# Patient Record
Sex: MALE | Race: WHITE
[De-identification: names, ages, dates, MRNs, and addresses within clinical notes are randomized per-mention and may not be internally consistent; named-entity substitution may affect disease eponyms.]

---

## 2020-08-27 ENCOUNTER — HOSPITAL ENCOUNTER (OUTPATIENT)
Dept: HOSPITAL 92 - TBSIIMAG | Age: 70
Discharge: HOME | End: 2020-08-27
Attending: NEUROLOGICAL SURGERY
Payer: MEDICARE

## 2020-08-27 DIAGNOSIS — R29.890: ICD-10-CM

## 2020-08-27 DIAGNOSIS — M41.86: ICD-10-CM

## 2020-08-27 DIAGNOSIS — S32.051D: ICD-10-CM

## 2020-08-27 DIAGNOSIS — M47.816: ICD-10-CM

## 2020-08-27 DIAGNOSIS — Z95.828: ICD-10-CM

## 2020-08-27 DIAGNOSIS — M54.9: Primary | ICD-10-CM

## 2020-08-27 PROCEDURE — 72100 X-RAY EXAM L-S SPINE 2/3 VWS: CPT
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
## 2020-08-31 ENCOUNTER — HOSPITAL ENCOUNTER (OUTPATIENT)
Dept: HOSPITAL 92 - LABBT | Age: 70
Discharge: HOME | End: 2020-08-31
Attending: THORACIC SURGERY (CARDIOTHORACIC VASCULAR SURGERY)
Payer: MEDICARE

## 2020-08-31 DIAGNOSIS — T45.515A: ICD-10-CM

## 2020-08-31 DIAGNOSIS — Z20.828: Primary | ICD-10-CM

## 2020-08-31 PROCEDURE — U0003 INFECTIOUS AGENT DETECTION BY NUCLEIC ACID (DNA OR RNA); SEVERE ACUTE RESPIRATORY SYNDROME CORONAVIRUS 2 (SARS-COV-2) (CORONAVIRUS DISEASE [COVID-19]), AMPLIFIED PROBE TECHNIQUE, MAKING USE OF HIGH THROUGHPUT TECHNOLOGIES AS DESCRIBED BY CMS-2020-01-R: HCPCS

## 2020-08-31 PROCEDURE — 87635 SARS-COV-2 COVID-19 AMP PRB: CPT
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
## 2020-09-04 ENCOUNTER — HOSPITAL ENCOUNTER (OUTPATIENT)
Dept: HOSPITAL 92 - CCL | Age: 70
Discharge: ANOTHER HOSPITAL | End: 2020-09-04
Attending: THORACIC SURGERY (CARDIOTHORACIC VASCULAR SURGERY)
Payer: MEDICARE

## 2020-09-04 VITALS — BODY MASS INDEX: 25.1 KG/M2

## 2020-09-04 DIAGNOSIS — Z88.0: ICD-10-CM

## 2020-09-04 DIAGNOSIS — I10: ICD-10-CM

## 2020-09-04 DIAGNOSIS — Z79.899: ICD-10-CM

## 2020-09-04 DIAGNOSIS — Z88.5: ICD-10-CM

## 2020-09-04 DIAGNOSIS — F03.90: ICD-10-CM

## 2020-09-04 DIAGNOSIS — Z88.2: ICD-10-CM

## 2020-09-04 DIAGNOSIS — Z45.89: Primary | ICD-10-CM

## 2020-09-04 DIAGNOSIS — Z77.098: ICD-10-CM

## 2020-09-04 DIAGNOSIS — E11.9: ICD-10-CM

## 2020-09-04 DIAGNOSIS — Z88.1: ICD-10-CM

## 2020-09-04 DIAGNOSIS — Z79.82: ICD-10-CM

## 2020-09-04 DIAGNOSIS — J44.9: ICD-10-CM

## 2020-09-04 PROCEDURE — 06PY3DZ REMOVAL OF INTRALUMINAL DEVICE FROM LOWER VEIN, PERCUTANEOUS APPROACH: ICD-10-PCS | Performed by: THORACIC SURGERY (CARDIOTHORACIC VASCULAR SURGERY)

## 2020-09-04 PROCEDURE — 99152 MOD SED SAME PHYS/QHP 5/>YRS: CPT

## 2020-09-04 PROCEDURE — 76942 ECHO GUIDE FOR BIOPSY: CPT

## 2020-09-04 PROCEDURE — 99153 MOD SED SAME PHYS/QHP EA: CPT

## 2020-09-04 PROCEDURE — 37193 REM ENDOVAS VENA CAVA FILTER: CPT

## 2020-09-04 NOTE — OP
DATE OF PROCEDURE:  09/04/2020



PREOPERATIVE DIAGNOSIS:  Desire for removal of temporary Cook Celect vena cava

filter. 



POSTOPERATIVE DIAGNOSES:  Desire for removal of temporary Cook Celect vena cava

filter. 

Trapped filter snare.



PROCEDURES PERFORMED:  

1. Ultrasound-guided right jugular access.

2. Inferior vena cavogram.

3. Long attempt at removal of filter with ultimate entrapment of snare in the filter

mechanism. 



ANESTHESIA:  1% lidocaine for local, 50 mcg fentanyl, 2 mg Versed for sedation and

pain control. 



ESTIMATED BLOOD LOSS:  Approximately 100.



TOTAL FLUORO TIME:  52.7 minutes.



CONTRAST:  40 mL.



DESCRIPTION OF PROCEDURE:  After consent was obtained, the patient was brought to

the cath lab, placed in supine position on cath lab table.  Appropriate central line

and monitors were placed.  At this time, the patient was given sedation.  Right neck

was prepped and draped in usual sterile fashion.  The skin and subcutaneous tissues

were anesthetized under ultrasound guidance. Using ultrasound guidance, the

percutaneous access to the jugular vein was obtained.  A 6-Yakut sheath was placed.

 Angled glide catheter and Bentson wire were passed down inferior to the filter and

hand-injected venacavogram performed, showing normal vena cava appearance with no

thrombus.  The sheath was removed and the tract upsized to the 12-Yakut Cook

sheath.  Using the Cook snare, multiple attempts at snaring the catheter were

performed, eventually passing what I felt to be the snare around the tip of the

catheter and it became entrapped.  This may have actually passed down underneath one

of the free limbs and become entrapped.  I could not get the catheter loose.   I

buddied a second snare and I was unable to snare the tip of the filter.  After

discussion with Dr. Rutledge in Interventional Radiology at [facility], he

suggested we transfer the patient to [place] and I spoke with Dr. Alberto Bailey, who has

accepted the patient and agreed to attempt to remove the filter with more advanced

techniques than I have available.  The catheters were sterilely bundled with the

sheath in place.  The patient will be transferred via ambulance to [place]. 







Job ID:  [id number]

## 2020-09-30 ENCOUNTER — HOSPITAL ENCOUNTER (OUTPATIENT)
Dept: HOSPITAL 92 - TBSIIMAG | Age: 70
Discharge: HOME | End: 2020-09-30
Attending: NEUROLOGICAL SURGERY
Payer: MEDICARE

## 2020-09-30 DIAGNOSIS — M48.56XA: Primary | ICD-10-CM

## 2020-09-30 PROCEDURE — 72100 X-RAY EXAM L-S SPINE 2/3 VWS: CPT

## 2020-09-30 NOTE — RAD
XR Lumbar Spine 2 Or 3 View



History: Closed compression fracture



Comparison: Radiograph August 27, 2020



Findings: Similar appearance complete breast fracture of L5 with retropulsion. Minimal height loss of
 the anterior superior T12 endplate.



No IVC filter is appreciated. Paraspinal soft tissues are unremarkable.



Impression: No progressive L5 burst fracture height loss.



Reported By: Reggie Alva 

Electronically Signed:  9/30/2020 2:22 PM

## 2020-10-26 ENCOUNTER — HOSPITAL ENCOUNTER (OUTPATIENT)
Dept: HOSPITAL 92 - SCSCT | Age: 70
Discharge: HOME | End: 2020-10-26
Attending: NEUROLOGICAL SURGERY
Payer: MEDICARE

## 2020-10-26 DIAGNOSIS — S22.008A: Primary | ICD-10-CM

## 2020-10-26 DIAGNOSIS — M48.061: ICD-10-CM

## 2020-10-26 DIAGNOSIS — M47.816: ICD-10-CM

## 2020-10-26 PROCEDURE — 72131 CT LUMBAR SPINE W/O DYE: CPT

## 2020-10-26 NOTE — CT
Exam: Lumbar spine CT without contrast



HISTORY: Follow-up lumbar spine fracture.



COMPARISON: Chest/abdomen/pelvis CT 7/24/2020.



FINDINGS:

Limited evaluation of the solid organs and paraspinal muscles as well as the alimentary canal by the 
lack of contrast and due to motion degradation. Grossly no abnormality. There are some nonspecific

right lower quadrant mesenteric lymph nodes. Atherosclerosis of a nonaneurysmal aorta is identified. 
No evidence of obstructive uropathy. Nonobstructing calculus in the left renal pelvis.

Once again there are 5 lumbar type vertebra. From L1 to L4, vertebral body heights are maintained. No
 fracture. There is progression of loss of vertebral body height at L5 with essentially vertebral

plana. There is retropulsion of the L5 vertebral body. There is moderate to severe severe central can
al stenosis at L5. There is bilateral facet hypertrophy. Moderate to severe bilateral neural

foraminal narrowing.

Limited evaluation the contents of the central spinal canal and neural foramina due to technique.

L2-L3: Vacuum disc phenomenon. Mild central canal stenosis. Mild bilateral neural foraminal narrowing
.

L3-L4: Broad-based disc bulge abuts the thecal sac. Mild central canal stenosis. Mild bilateral neura
l foraminal narrowing.

L4-L5: Broad-based disc bulge, ligamentum flavum thickening and facet hypertrophy. Moderate central c
anal stenosis. Moderate to severe bilateral neural foraminal narrowing.

L5-S1: Broad-based disc bulge. No significant central canal stenosis. Moderate to 

severe bilateral neural foraminal narrowing.



IMPRESSION:

1. Progression of loss of vertebral body height at L5. There is essentially vertebral plana. Retropul
nuris with moderate to severe central canal stenosis.

2. Multilevel degenerative changes of the lumbar spine as described above.



Transcribed Date/Time: 10/26/2020 1:36 PM



Reported By: Cody Mckinney 

Electronically Signed:  10/26/2020 1:39 PM

## 2020-11-30 ENCOUNTER — HOSPITAL ENCOUNTER (OUTPATIENT)
Dept: HOSPITAL 92 - SCSRAD | Age: 70
Discharge: HOME | End: 2020-11-30
Payer: MEDICARE

## 2020-11-30 DIAGNOSIS — S32.051A: ICD-10-CM

## 2020-11-30 DIAGNOSIS — M54.5: Primary | ICD-10-CM

## 2020-11-30 PROCEDURE — 72100 X-RAY EXAM L-S SPINE 2/3 VWS: CPT

## 2020-11-30 NOTE — RAD
LUMBAR SPINE RADIOGRAPHS:

 

Date:  11/30/2020

 

PROVIDED CLINICAL HISTORY:   

Fracture. 

 

FINDINGS:

 

Comparison with 09/30/2020. 

 

Five non-rib bearing lumbar-type vertebral bodies are demonstrated. Burst fracture of L5 is again see
n. Vertebral body heights appear otherwise preserved. Lumbar disc and facet degenerative changes appe
ar similar. Vascular calcification is again seen. Lumbar alignment appears unchanged. 

 

IMPRESSION: 

Redemonstration of L5 burst fracture, similar to prior. 

 

 

POS: IVETTE

## 2021-01-04 ENCOUNTER — HOSPITAL ENCOUNTER (OUTPATIENT)
Dept: HOSPITAL 92 - SCSRAD | Age: 71
Discharge: HOME | End: 2021-01-04
Attending: NEUROLOGICAL SURGERY
Payer: MEDICARE

## 2021-01-04 DIAGNOSIS — S22.008A: Primary | ICD-10-CM

## 2021-01-04 DIAGNOSIS — S32.051A: ICD-10-CM

## 2021-01-04 DIAGNOSIS — R29.890: ICD-10-CM

## 2021-01-04 PROCEDURE — 72100 X-RAY EXAM L-S SPINE 2/3 VWS: CPT

## 2021-01-04 NOTE — RAD
EXAM:

XR Lumbar Spine 2 Or 3 View



PROVIDED CLINICAL HISTORY:

Follow-up L5 fracture.



COMPARISON:

11/30/2020



FINDINGS:

The burst fracture L5 vertebral body is again seen. Degree of height loss is similar to prior exam. R
emaining vertebral body heights are within normal limits, and no subluxation is seen. Scattered

osteophytes are seen in the lumbar spine. Vascular calcifications are again seen in the abdominal aor
ta.



IMPRESSION:

L5 vertebral body burst fracture with similar degree of height loss compared to prior study.



Reported By: Darnell Wheat 

Electronically Signed:  1/4/2021 1:35 PM

## 2022-11-14 ENCOUNTER — HOSPITAL ENCOUNTER (EMERGENCY)
Dept: HOSPITAL 18 - NAV ERS | Age: 72
Discharge: ANOTHER HOSPITAL | End: 2022-11-14
Payer: MEDICARE

## 2022-11-14 ENCOUNTER — HOSPITAL ENCOUNTER (INPATIENT)
Dept: HOSPITAL 92 - CSHERS | Age: 72
LOS: 6 days | Discharge: HOME | DRG: 377 | End: 2022-11-20
Attending: INTERNAL MEDICINE | Admitting: INTERNAL MEDICINE
Payer: MEDICARE

## 2022-11-14 VITALS — BODY MASS INDEX: 27 KG/M2

## 2022-11-14 DIAGNOSIS — Z79.82: ICD-10-CM

## 2022-11-14 DIAGNOSIS — J44.9: ICD-10-CM

## 2022-11-14 DIAGNOSIS — Z20.822: ICD-10-CM

## 2022-11-14 DIAGNOSIS — Z98.41: ICD-10-CM

## 2022-11-14 DIAGNOSIS — R45.1: ICD-10-CM

## 2022-11-14 DIAGNOSIS — Z88.1: ICD-10-CM

## 2022-11-14 DIAGNOSIS — E87.29: ICD-10-CM

## 2022-11-14 DIAGNOSIS — E86.1: ICD-10-CM

## 2022-11-14 DIAGNOSIS — Z87.891: ICD-10-CM

## 2022-11-14 DIAGNOSIS — K92.2: Primary | ICD-10-CM

## 2022-11-14 DIAGNOSIS — Z88.0: ICD-10-CM

## 2022-11-14 DIAGNOSIS — N17.9: ICD-10-CM

## 2022-11-14 DIAGNOSIS — I10: ICD-10-CM

## 2022-11-14 DIAGNOSIS — K26.4: Primary | ICD-10-CM

## 2022-11-14 DIAGNOSIS — D62: ICD-10-CM

## 2022-11-14 DIAGNOSIS — Z88.2: ICD-10-CM

## 2022-11-14 DIAGNOSIS — E11.65: ICD-10-CM

## 2022-11-14 DIAGNOSIS — Z98.42: ICD-10-CM

## 2022-11-14 DIAGNOSIS — D64.9: ICD-10-CM

## 2022-11-14 DIAGNOSIS — E11.9: ICD-10-CM

## 2022-11-14 DIAGNOSIS — Z79.899: ICD-10-CM

## 2022-11-14 DIAGNOSIS — F43.10: ICD-10-CM

## 2022-11-14 DIAGNOSIS — E78.5: ICD-10-CM

## 2022-11-14 DIAGNOSIS — Z88.6: ICD-10-CM

## 2022-11-14 DIAGNOSIS — R57.8: ICD-10-CM

## 2022-11-14 DIAGNOSIS — Z90.49: ICD-10-CM

## 2022-11-14 DIAGNOSIS — F05: ICD-10-CM

## 2022-11-14 DIAGNOSIS — R65.10: ICD-10-CM

## 2022-11-14 LAB
ALBUMIN SERPL BCG-MCNC: 3.1 G/DL (ref 3.4–4.8)
ALBUMIN SERPL BCG-MCNC: 3.1 G/DL (ref 3.4–4.8)
ALP SERPL-CCNC: 41 U/L (ref 40–110)
ALP SERPL-CCNC: 48 U/L (ref 40–110)
ALT SERPL W P-5'-P-CCNC: 20 U/L (ref 8–55)
ALT SERPL W P-5'-P-CCNC: 22 U/L (ref 8–55)
ANION GAP SERPL CALC-SCNC: 11 MMOL/L (ref 10–20)
ANION GAP SERPL CALC-SCNC: 17 MMOL/L (ref 10–20)
APTT PPP: 21.9 SEC (ref 22–33)
APTT PPP: 29.5 SEC (ref 22.9–36.1)
AST SERPL-CCNC: 16 U/L (ref 5–34)
AST SERPL-CCNC: 18 U/L (ref 5–34)
BASOPHILS # BLD AUTO: 0 10X3/UL (ref 0–0.2)
BASOPHILS NFR BLD AUTO: 0.1 % (ref 0–2)
BILIRUB SERPL-MCNC: 0.4 MG/DL (ref 0.2–1.2)
BILIRUB SERPL-MCNC: 0.5 MG/DL (ref 0.2–1.2)
BUN SERPL-MCNC: 64 MG/DL (ref 8.4–25.7)
BUN SERPL-MCNC: 66 MG/DL (ref 8.4–25.7)
CALCIUM SERPL-MCNC: 8.4 MG/DL (ref 7.8–10.44)
CALCIUM SERPL-MCNC: 8.7 MG/DL (ref 7.8–10.44)
CHLORIDE SERPL-SCNC: 114 MMOL/L (ref 98–107)
CHLORIDE SERPL-SCNC: 117 MMOL/L (ref 98–107)
CO2 SERPL-SCNC: 16 MMOL/L (ref 23–31)
CO2 SERPL-SCNC: 20 MMOL/L (ref 23–31)
CREAT CL PREDICTED SERPL C-G-VRATE: 0 ML/MIN (ref 70–130)
CREAT CL PREDICTED SERPL C-G-VRATE: 0 ML/MIN (ref 70–130)
EOSINOPHIL # BLD AUTO: 0 10X3/UL (ref 0–0.5)
EOSINOPHIL NFR BLD AUTO: 0 % (ref 0–6)
GLOBULIN SER CALC-MCNC: 1.5 G/DL (ref 2.4–3.5)
GLOBULIN SER CALC-MCNC: 1.6 G/DL (ref 2.4–3.5)
GLUCOSE SERPL-MCNC: 154 MG/DL (ref 83–110)
GLUCOSE SERPL-MCNC: 291 MG/DL (ref 83–110)
HGB BLD-MCNC: 7 G/DL (ref 13.5–17.5)
HGB BLD-MCNC: 7.1 G/DL (ref 14–18)
INR PPP: 1.1
INR PPP: 1.3
LYMPHOCYTES NFR BLD AUTO: 20.5 % (ref 18–47)
MANUAL DIFF??: YES
MCH RBC QN AUTO: 30.3 PG (ref 27–33)
MCH RBC QN AUTO: 31.2 PG (ref 27–31)
MCV RBC AUTO: 90 FL (ref 81.2–95.1)
MCV RBC AUTO: 96 FL (ref 78–98)
MDIFF COMPLETE?: YES
MONOCYTES # BLD AUTO: 0.7 10X3/UL (ref 0–1.1)
MONOCYTES NFR BLD AUTO: 4.8 % (ref 0–10)
NEUTROPHILS # BLD AUTO: 10.2 10X3/UL (ref 1.5–8.4)
NEUTROPHILS NFR BLD AUTO: 73.6 % (ref 40–75)
PLATELET # BLD AUTO: 214 10X3/UL (ref 150–450)
PLATELET # BLD AUTO: 242 10X3/UL (ref 130–400)
POTASSIUM SERPL-SCNC: 3.9 MMOL/L (ref 3.5–5.1)
POTASSIUM SERPL-SCNC: 4.2 MMOL/L (ref 3.5–5.1)
PROT UR STRIP.AUTO-MCNC: 15 MG/DL
PROTHROMBIN TIME: 11.9 SEC (ref 9.5–12.1)
PROTHROMBIN TIME: 16.5 SEC (ref 12–14.7)
RBC # BLD AUTO: 2.27 MILL/UL (ref 4.7–6.1)
RBC # BLD AUTO: 2.31 10X6/UL (ref 4.32–5.72)
RBC UR QL AUTO: (no result) HPF (ref 0–3)
SODIUM SERPL-SCNC: 143 MMOL/L (ref 136–145)
SODIUM SERPL-SCNC: 144 MMOL/L (ref 136–145)
SP GR UR STRIP: 1.01 (ref 1–1.03)
WBC # BLD AUTO: 13.9 10X3/UL (ref 3.5–10.5)
WBC # BLD AUTO: 19 10X3/UL (ref 4.8–10.8)
WBC UR QL AUTO: (no result) HPF (ref 0–3)

## 2022-11-14 PROCEDURE — 86922 COMPATIBILITY TEST ANTIGLOB: CPT

## 2022-11-14 PROCEDURE — 93005 ELECTROCARDIOGRAM TRACING: CPT

## 2022-11-14 PROCEDURE — 83036 HEMOGLOBIN GLYCOSYLATED A1C: CPT

## 2022-11-14 PROCEDURE — 86901 BLOOD TYPING SEROLOGIC RH(D): CPT

## 2022-11-14 PROCEDURE — 86900 BLOOD TYPING SEROLOGIC ABO: CPT

## 2022-11-14 PROCEDURE — C9113 INJ PANTOPRAZOLE SODIUM, VIA: HCPCS

## 2022-11-14 PROCEDURE — 85025 COMPLETE CBC W/AUTO DIFF WBC: CPT

## 2022-11-14 PROCEDURE — 96374 THER/PROPH/DIAG INJ IV PUSH: CPT

## 2022-11-14 PROCEDURE — 85730 THROMBOPLASTIN TIME PARTIAL: CPT

## 2022-11-14 PROCEDURE — U0002 COVID-19 LAB TEST NON-CDC: HCPCS

## 2022-11-14 PROCEDURE — 94002 VENT MGMT INPAT INIT DAY: CPT

## 2022-11-14 PROCEDURE — 86850 RBC ANTIBODY SCREEN: CPT

## 2022-11-14 PROCEDURE — 80053 COMPREHEN METABOLIC PANEL: CPT

## 2022-11-14 PROCEDURE — 85610 PROTHROMBIN TIME: CPT

## 2022-11-14 PROCEDURE — 83735 ASSAY OF MAGNESIUM: CPT

## 2022-11-14 PROCEDURE — 87338 HPYLORI STOOL AG IA: CPT

## 2022-11-14 PROCEDURE — A4215 STERILE NEEDLE: HCPCS

## 2022-11-14 PROCEDURE — 96375 TX/PRO/DX INJ NEW DRUG ADDON: CPT

## 2022-11-14 PROCEDURE — 84100 ASSAY OF PHOSPHORUS: CPT

## 2022-11-14 PROCEDURE — 36415 COLL VENOUS BLD VENIPUNCTURE: CPT

## 2022-11-14 PROCEDURE — 71045 X-RAY EXAM CHEST 1 VIEW: CPT

## 2022-11-14 PROCEDURE — 80048 BASIC METABOLIC PNL TOTAL CA: CPT

## 2022-11-14 PROCEDURE — 81001 URINALYSIS AUTO W/SCOPE: CPT

## 2022-11-14 PROCEDURE — 94003 VENT MGMT INPAT SUBQ DAY: CPT

## 2022-11-14 PROCEDURE — 82805 BLOOD GASES W/O2 SATURATION: CPT

## 2022-11-14 PROCEDURE — P9016 RBC LEUKOCYTES REDUCED: HCPCS

## 2022-11-14 PROCEDURE — 36430 TRANSFUSION BLD/BLD COMPNT: CPT

## 2022-11-14 PROCEDURE — 85060 BLOOD SMEAR INTERPRETATION: CPT

## 2022-11-14 PROCEDURE — P9059 PLASMA, FRZ BETWEEN 8-24HOUR: HCPCS

## 2022-11-14 PROCEDURE — 96361 HYDRATE IV INFUSION ADD-ON: CPT

## 2022-11-14 PROCEDURE — 94760 N-INVAS EAR/PLS OXIMETRY 1: CPT

## 2022-11-14 PROCEDURE — 36600 WITHDRAWAL OF ARTERIAL BLOOD: CPT

## 2022-11-14 RX ADMIN — ONDANSETRON PRN MG: 2 INJECTION INTRAMUSCULAR; INTRAVENOUS at 22:27

## 2022-11-15 LAB
ANALYZER IN CARDIO: (no result)
ANION GAP SERPL CALC-SCNC: 10 MMOL/L (ref 10–20)
ANISOCYTOSIS BLD QL SMEAR: (no result) (100X)
BASE EXCESS STD BLDA CALC-SCNC: -5 MEQ/L
BASOPHILS # BLD AUTO: 0 10X3/UL (ref 0–0.2)
BASOPHILS NFR BLD AUTO: 0.1 % (ref 0–2)
BASOPHILS NFR BLD AUTO: 0.2 % (ref 0–2)
BASOPHILS NFR BLD AUTO: 0.3 % (ref 0–2)
BUN SERPL-MCNC: 59 MG/DL (ref 8.4–25.7)
CA-I BLDA-SCNC: 1.05 MMOL/L (ref 1.12–1.3)
CALCIUM SERPL-MCNC: 7.7 MG/DL (ref 7.8–10.44)
CHLORIDE SERPL-SCNC: 118 MMOL/L (ref 98–107)
CO2 SERPL-SCNC: 20 MMOL/L (ref 23–31)
CREAT CL PREDICTED SERPL C-G-VRATE: 61 ML/MIN (ref 70–130)
EOSINOPHIL # BLD AUTO: 0 10X3/UL (ref 0–0.5)
EOSINOPHIL NFR BLD AUTO: 0 % (ref 0–6)
EOSINOPHIL NFR BLD AUTO: 0 % (ref 0–6)
EOSINOPHIL NFR BLD AUTO: 0.1 % (ref 0–6)
GLUCOSE SERPL-MCNC: 168 MG/DL (ref 83–110)
HCO3 BLDA-SCNC: 20.1 MEQ/L (ref 22–28)
HCT VFR BLDA CALC: 19 % (ref 42–52)
HGB BLD-MCNC: 4.4 G/DL (ref 13.5–17.5)
HGB BLD-MCNC: 6.7 G/DL (ref 13.5–17.5)
HGB BLD-MCNC: 7.4 G/DL (ref 13.5–17.5)
HGB BLD-MCNC: 7.8 G/DL (ref 13.5–17.5)
HGB BLDA-MCNC: 6.4 G/DL (ref 14–18)
LYMPHOCYTES NFR BLD AUTO: 17 % (ref 18–47)
LYMPHOCYTES NFR BLD AUTO: 26.7 % (ref 18–47)
LYMPHOCYTES NFR BLD AUTO: 29.2 % (ref 18–47)
MCH RBC QN AUTO: 30 PG (ref 27–33)
MCH RBC QN AUTO: 30.4 PG (ref 27–33)
MCH RBC QN AUTO: 31 PG (ref 27–33)
MCV RBC AUTO: 89.9 FL (ref 81.2–95.1)
MCV RBC AUTO: 91.5 FL (ref 81.2–95.1)
MCV RBC AUTO: 93 FL (ref 81.2–95.1)
MONOCYTES # BLD AUTO: 1 10X3/UL (ref 0–1.1)
MONOCYTES # BLD AUTO: 1.1 10X3/UL (ref 0–1.1)
MONOCYTES # BLD AUTO: 1.2 10X3/UL (ref 0–1.1)
MONOCYTES NFR BLD AUTO: 5.9 % (ref 0–10)
MONOCYTES NFR BLD AUTO: 8 % (ref 0–10)
MONOCYTES NFR BLD AUTO: 8.8 % (ref 0–10)
NEUTROPHILS # BLD AUTO: 13.1 10X3/UL (ref 1.5–8.4)
NEUTROPHILS # BLD AUTO: 7.5 10X3/UL (ref 1.5–8.4)
NEUTROPHILS # BLD AUTO: 8.9 10X3/UL (ref 1.5–8.4)
NEUTROPHILS NFR BLD AUTO: 63.4 % (ref 40–75)
NEUTROPHILS NFR BLD AUTO: 63.4 % (ref 40–75)
NEUTROPHILS NFR BLD AUTO: 73.7 % (ref 40–75)
O2 A-A PPRESDIFF RESPIRATORY: 152.18 MMHG (ref 0–20)
OVALOCYTES BLD QL SMEAR: (no result) (100X)
PCO2 BLDA: 36.5 MMHG (ref 35–45)
PH BLDA: 7.36 [PH] (ref 7.35–7.45)
PLATELET # BLD AUTO: 131 10X3/UL (ref 150–450)
PLATELET # BLD AUTO: 148 10X3/UL (ref 150–450)
PLATELET # BLD AUTO: 153 10X3/UL (ref 150–450)
PO2 BLDA: 515.2 MMHG (ref 70–?)
POLYCHROMASIA BLD QL SMEAR: (no result) (100X)
POTASSIUM BLD-SCNC: 3.7 MMOL/L (ref 3.7–5.3)
POTASSIUM SERPL-SCNC: 4 MMOL/L (ref 3.5–5.1)
RBC # BLD AUTO: 1.42 10X6/UL (ref 4.32–5.72)
RBC # BLD AUTO: 2.23 10X6/UL (ref 4.32–5.72)
RBC # BLD AUTO: 2.57 10X6/UL (ref 4.32–5.72)
REFLEX FOR REVIEW??: YES
SODIUM SERPL-SCNC: 144 MMOL/L (ref 136–145)
SPECIMEN DRAWN FROM PATIENT: (no result)
WBC # BLD AUTO: 11.9 10X3/UL (ref 3.5–10.5)
WBC # BLD AUTO: 12 10X3/UL (ref 3.5–10.5)
WBC # BLD AUTO: 20.6 10X3/UL (ref 3.5–10.5)

## 2022-11-15 PROCEDURE — 30233N1 TRANSFUSION OF NONAUTOLOGOUS RED BLOOD CELLS INTO PERIPHERAL VEIN, PERCUTANEOUS APPROACH: ICD-10-PCS | Performed by: INTERNAL MEDICINE

## 2022-11-15 PROCEDURE — 0W3P8ZZ CONTROL BLEEDING IN GASTROINTESTINAL TRACT, VIA NATURAL OR ARTIFICIAL OPENING ENDOSCOPIC: ICD-10-PCS | Performed by: INTERNAL MEDICINE

## 2022-11-15 PROCEDURE — 30233K1 TRANSFUSION OF NONAUTOLOGOUS FROZEN PLASMA INTO PERIPHERAL VEIN, PERCUTANEOUS APPROACH: ICD-10-PCS | Performed by: INTERNAL MEDICINE

## 2022-11-15 RX ADMIN — Medication SCH MLS: at 22:52

## 2022-11-15 RX ADMIN — ONDANSETRON PRN MG: 2 INJECTION INTRAMUSCULAR; INTRAVENOUS at 18:51

## 2022-11-16 LAB
ANION GAP SERPL CALC-SCNC: 7 MMOL/L (ref 10–20)
BASOPHILS # BLD AUTO: 0 10X3/UL (ref 0–0.2)
BASOPHILS NFR BLD AUTO: 0.1 % (ref 0–2)
BUN SERPL-MCNC: 35 MG/DL (ref 8.4–25.7)
CALCIUM SERPL-MCNC: 7.3 MG/DL (ref 7.8–10.44)
CHLORIDE SERPL-SCNC: 116 MMOL/L (ref 98–107)
CO2 SERPL-SCNC: 24 MMOL/L (ref 23–31)
CREAT CL PREDICTED SERPL C-G-VRATE: 73 ML/MIN (ref 70–130)
EOSINOPHIL # BLD AUTO: 0 10X3/UL (ref 0–0.5)
EOSINOPHIL NFR BLD AUTO: 0.1 % (ref 0–6)
GLUCOSE SERPL-MCNC: 187 MG/DL (ref 83–110)
HGB BLD-MCNC: 6.2 G/DL (ref 13.5–17.5)
HGB BLD-MCNC: 7 G/DL (ref 13.5–17.5)
HGB BLD-MCNC: 7.3 G/DL (ref 13.5–17.5)
LYMPHOCYTES NFR BLD AUTO: 15.2 % (ref 18–47)
MCH RBC QN AUTO: 29.9 PG (ref 27–33)
MCH RBC QN AUTO: 30.3 PG (ref 27–33)
MCH RBC QN AUTO: 31 PG (ref 27–33)
MCV RBC AUTO: 86.9 FL (ref 81.2–95.1)
MCV RBC AUTO: 87 FL (ref 81.2–95.1)
MCV RBC AUTO: 90 FL (ref 81.2–95.1)
MONOCYTES # BLD AUTO: 0.9 10X3/UL (ref 0–1.1)
MONOCYTES NFR BLD AUTO: 8 % (ref 0–10)
NEUTROPHILS # BLD AUTO: 7.9 10X3/UL (ref 1.5–8.4)
NEUTROPHILS NFR BLD AUTO: 75.1 % (ref 40–75)
PLATELET # BLD AUTO: 73 10X3/UL (ref 150–450)
PLATELET # BLD AUTO: 79 10X3/UL (ref 150–450)
PLATELET # BLD AUTO: 83 10X3/UL (ref 150–450)
POTASSIUM SERPL-SCNC: 4.1 MMOL/L (ref 3.5–5.1)
RBC # BLD AUTO: 2 10X6/UL (ref 4.32–5.72)
RBC # BLD AUTO: 2.31 10X6/UL (ref 4.32–5.72)
RBC # BLD AUTO: 2.44 10X6/UL (ref 4.32–5.72)
SODIUM SERPL-SCNC: 143 MMOL/L (ref 136–145)
WBC # BLD AUTO: 10.6 10X3/UL (ref 3.5–10.5)
WBC # BLD AUTO: 11.7 10X3/UL (ref 3.5–10.5)
WBC # BLD AUTO: 9.5 10X3/UL (ref 3.5–10.5)

## 2022-11-16 PROCEDURE — 5A1935Z RESPIRATORY VENTILATION, LESS THAN 24 CONSECUTIVE HOURS: ICD-10-PCS | Performed by: INTERNAL MEDICINE

## 2022-11-16 PROCEDURE — 0BH17EZ INSERTION OF ENDOTRACHEAL AIRWAY INTO TRACHEA, VIA NATURAL OR ARTIFICIAL OPENING: ICD-10-PCS | Performed by: INTERNAL MEDICINE

## 2022-11-16 RX ADMIN — Medication SCH MLS: at 14:18

## 2022-11-16 RX ADMIN — DEXMEDETOMIDINE HYDROCHLORIDE SCH MLS: 4 INJECTION, SOLUTION INTRAVENOUS at 15:22

## 2022-11-17 LAB
ANION GAP SERPL CALC-SCNC: 8 MMOL/L (ref 10–20)
BASOPHILS # BLD AUTO: 0 10X3/UL (ref 0–0.2)
BASOPHILS # BLD AUTO: 0 10X3/UL (ref 0–0.2)
BASOPHILS NFR BLD AUTO: 0.1 % (ref 0–2)
BASOPHILS NFR BLD AUTO: 0.2 % (ref 0–2)
BUN SERPL-MCNC: 29 MG/DL (ref 8.4–25.7)
CALCIUM SERPL-MCNC: 7.3 MG/DL (ref 7.8–10.44)
CHLORIDE SERPL-SCNC: 116 MMOL/L (ref 98–107)
CO2 SERPL-SCNC: 22 MMOL/L (ref 23–31)
CREAT CL PREDICTED SERPL C-G-VRATE: 77 ML/MIN (ref 70–130)
EOSINOPHIL # BLD AUTO: 0 10X3/UL (ref 0–0.5)
EOSINOPHIL # BLD AUTO: 0.1 10X3/UL (ref 0–0.5)
EOSINOPHIL NFR BLD AUTO: 0.3 % (ref 0–6)
EOSINOPHIL NFR BLD AUTO: 1.1 % (ref 0–6)
GLUCOSE SERPL-MCNC: 136 MG/DL (ref 83–110)
HGB BLD-MCNC: 6.2 G/DL (ref 13.5–17.5)
HGB BLD-MCNC: 6.4 G/DL (ref 13.5–17.5)
HGB BLD-MCNC: 7.6 G/DL (ref 13.5–17.5)
LYMPHOCYTES NFR BLD AUTO: 18.8 % (ref 18–47)
LYMPHOCYTES NFR BLD AUTO: 27.3 % (ref 18–47)
MCH RBC QN AUTO: 29.8 PG (ref 27–33)
MCH RBC QN AUTO: 30.4 PG (ref 27–33)
MCV RBC AUTO: 87.8 FL (ref 81.2–95.1)
MCV RBC AUTO: 88.7 FL (ref 81.2–95.1)
MONOCYTES # BLD AUTO: 0.4 10X3/UL (ref 0–1.1)
MONOCYTES # BLD AUTO: 0.6 10X3/UL (ref 0–1.1)
MONOCYTES NFR BLD AUTO: 6.3 % (ref 0–10)
MONOCYTES NFR BLD AUTO: 6.9 % (ref 0–10)
NEUTROPHILS # BLD AUTO: 3.6 10X3/UL (ref 1.5–8.4)
NEUTROPHILS # BLD AUTO: 6.5 10X3/UL (ref 1.5–8.4)
NEUTROPHILS NFR BLD AUTO: 64 % (ref 40–75)
NEUTROPHILS NFR BLD AUTO: 74 % (ref 40–75)
PLATELET # BLD AUTO: 104 10X3/UL (ref 150–450)
PLATELET # BLD AUTO: 92 10X3/UL (ref 150–450)
POTASSIUM SERPL-SCNC: 4.2 MMOL/L (ref 3.5–5.1)
RBC # BLD AUTO: 2.04 10X6/UL (ref 4.32–5.72)
RBC # BLD AUTO: 2.55 10X6/UL (ref 4.32–5.72)
SODIUM SERPL-SCNC: 142 MMOL/L (ref 136–145)
WBC # BLD AUTO: 5.7 10X3/UL (ref 3.5–10.5)
WBC # BLD AUTO: 8.9 10X3/UL (ref 3.5–10.5)

## 2022-11-17 RX ADMIN — DEXMEDETOMIDINE HYDROCHLORIDE SCH MLS: 4 INJECTION, SOLUTION INTRAVENOUS at 08:55

## 2022-11-18 LAB
ANION GAP SERPL CALC-SCNC: 8 MMOL/L (ref 10–20)
BASOPHILS # BLD AUTO: 0 10X3/UL (ref 0–0.2)
BASOPHILS NFR BLD AUTO: 0.2 % (ref 0–2)
BUN SERPL-MCNC: 19 MG/DL (ref 8.4–25.7)
CALCIUM SERPL-MCNC: 7.5 MG/DL (ref 7.8–10.44)
CHLORIDE SERPL-SCNC: 115 MMOL/L (ref 98–107)
CO2 SERPL-SCNC: 25 MMOL/L (ref 23–31)
CREAT CL PREDICTED SERPL C-G-VRATE: 90 ML/MIN (ref 70–130)
EOSINOPHIL # BLD AUTO: 0.1 10X3/UL (ref 0–0.5)
EOSINOPHIL NFR BLD AUTO: 1.1 % (ref 0–6)
GLUCOSE SERPL-MCNC: 91 MG/DL (ref 83–110)
HGB BLD-MCNC: 6.6 G/DL (ref 13.5–17.5)
HGB BLD-MCNC: 8.5 G/DL (ref 13.5–17.5)
LYMPHOCYTES NFR BLD AUTO: 24.2 % (ref 18–47)
MCH RBC QN AUTO: 30 PG (ref 27–33)
MCV RBC AUTO: 87.7 FL (ref 81.2–95.1)
MONOCYTES # BLD AUTO: 0.3 10X3/UL (ref 0–1.1)
MONOCYTES NFR BLD AUTO: 6.4 % (ref 0–10)
NEUTROPHILS # BLD AUTO: 3.1 10X3/UL (ref 1.5–8.4)
NEUTROPHILS NFR BLD AUTO: 67.4 % (ref 40–75)
PLATELET # BLD AUTO: 100 10X3/UL (ref 150–450)
POTASSIUM SERPL-SCNC: 3.1 MMOL/L (ref 3.5–5.1)
POTASSIUM SERPL-SCNC: 3.6 MMOL/L (ref 3.5–5.1)
RBC # BLD AUTO: 2.2 10X6/UL (ref 4.32–5.72)
SODIUM SERPL-SCNC: 145 MMOL/L (ref 136–145)
WBC # BLD AUTO: 4.5 10X3/UL (ref 3.5–10.5)

## 2022-11-18 PROCEDURE — 0DJ08ZZ INSPECTION OF UPPER INTESTINAL TRACT, VIA NATURAL OR ARTIFICIAL OPENING ENDOSCOPIC: ICD-10-PCS | Performed by: INTERNAL MEDICINE

## 2022-11-18 RX ADMIN — ONDANSETRON PRN MG: 2 INJECTION INTRAMUSCULAR; INTRAVENOUS at 09:49

## 2022-11-19 LAB
ANION GAP SERPL CALC-SCNC: 11 MMOL/L (ref 10–20)
BASOPHILS # BLD AUTO: 0 10X3/UL (ref 0–0.2)
BASOPHILS NFR BLD AUTO: 0.4 % (ref 0–2)
BUN SERPL-MCNC: 16 MG/DL (ref 8.4–25.7)
CALCIUM SERPL-MCNC: 7.9 MG/DL (ref 7.8–10.44)
CHLORIDE SERPL-SCNC: 112 MMOL/L (ref 98–107)
CO2 SERPL-SCNC: 22 MMOL/L (ref 23–31)
CREAT CL PREDICTED SERPL C-G-VRATE: 86 ML/MIN (ref 70–130)
EOSINOPHIL # BLD AUTO: 0.1 10X3/UL (ref 0–0.5)
EOSINOPHIL NFR BLD AUTO: 1.6 % (ref 0–6)
GLUCOSE SERPL-MCNC: 94 MG/DL (ref 83–110)
HGB BLD-MCNC: 8.7 G/DL (ref 13.5–17.5)
LYMPHOCYTES NFR BLD AUTO: 22.2 % (ref 18–47)
MAGNESIUM SERPL-MCNC: 1.8 MG/DL (ref 1.6–2.6)
MCH RBC QN AUTO: 29.9 PG (ref 27–33)
MCV RBC AUTO: 88 FL (ref 81.2–95.1)
MONOCYTES # BLD AUTO: 0.4 10X3/UL (ref 0–1.1)
MONOCYTES NFR BLD AUTO: 7.8 % (ref 0–10)
NEUTROPHILS # BLD AUTO: 3.7 10X3/UL (ref 1.5–8.4)
NEUTROPHILS NFR BLD AUTO: 67.6 % (ref 40–75)
PLATELET # BLD AUTO: 118 10X3/UL (ref 150–450)
POTASSIUM SERPL-SCNC: 3.6 MMOL/L (ref 3.5–5.1)
RBC # BLD AUTO: 2.91 10X6/UL (ref 4.32–5.72)
SODIUM SERPL-SCNC: 141 MMOL/L (ref 136–145)
WBC # BLD AUTO: 5.5 10X3/UL (ref 3.5–10.5)

## 2022-11-20 VITALS — TEMPERATURE: 98.3 F | DIASTOLIC BLOOD PRESSURE: 76 MMHG | SYSTOLIC BLOOD PRESSURE: 150 MMHG

## 2022-11-20 LAB — MAGNESIUM SERPL-MCNC: 2.2 MG/DL (ref 1.6–2.6)
